# Patient Record
(demographics unavailable — no encounter records)

---

## 2024-12-10 NOTE — ASSESSMENT
[FreeTextEntry1] : 1. FA - avoid peanut - epipen - immunocap to peanuts  2. AS - albuterol prn - immunocap to environmental allergies   3. AD - moisturize, cln wash and hydrocortisone ointments. - ? trial milk avoidance

## 2024-12-10 NOTE — REVIEW OF SYSTEMS
[Urticaria] : urticaria [Atopic Dermatitis] : atopic dermatitis [Pruritus] : pruritus [Nl] : Genitourinary

## 2024-12-10 NOTE — HISTORY OF PRESENT ILLNESS
[Allergic Rhinitis] : allergic rhinitis [Venom Reactions] : venom reactions [Drug Allergies] : drug allergies [None] : The patient is currently asymptomatic [de-identified] :  WILLIAMS ALCARAZ is a 14 year yo female who in early childhood had a reaction to peanut, she was evaluated at the time and came back allergic to egg, soy and milk as well. Mom avoided it for the most part and then later down the line she ate eggs, soy and dairy with no problems but still continues to avoid the peanut. She has had small exposures requiring Benadryl but not her Epipen. She also has had eczema since infancy. Mom notes that in the last few months she has had more flareups requiring the steroid ointment given by her pediatrician. She also has a history of asthma, well managed with albuterol as needed. She is also having random bouts of hives and mom states she cannot pinpoint any exposures to anything new. Mom is here to see the status of the peanut and to see if there are any foods related to the eczema flare ups.

## 2024-12-10 NOTE — REASON FOR VISIT
[Initial Consultation] : an initial consultation for [To Food] : allergy to food [Eczema] : eczema [Hives] : hives [Mother] : mother

## 2024-12-10 NOTE — PHYSICAL EXAM
[Alert] : alert [Well Nourished] : well nourished [Healthy Appearance] : healthy appearance [No Acute Distress] : no acute distress [Well Developed] : well developed [Normal Pupil & Iris Size/Symmetry] : normal pupil and iris size and symmetry [No Discharge] : no discharge [No Photophobia] : no photophobia [Sclera Not Icteric] : sclera not icteric [Normal TMs] : both tympanic membranes were normal [Normal Nasal Mucosa] : the nasal mucosa was normal [Normal Lips/Tongue] : the lips and tongue were normal [Normal Outer Ear/Nose] : the ears and nose were normal in appearance [Normal Tonsils] : normal tonsils [No Thrush] : no thrush [Pale mucosa] : pale mucosa [Supple] : the neck was supple [Normal Rate and Effort] : normal respiratory rhythm and effort [No Crackles] : no crackles [No Retractions] : no retractions [Bilateral Audible Breath Sounds] : bilateral audible breath sounds [Normal Rate] : heart rate was normal  [Normal S1, S2] : normal S1 and S2 [No murmur] : no murmur [Regular Rhythm] : with a regular rhythm [Skin Intact] : skin intact  [No Skin Lesions] : no skin lesions [Patches] : ~M patches present [Normal Mood] : mood was normal [Normal Affect] : affect was normal [Alert, Awake, Oriented as Age-Appropriate] : alert, awake, oriented as age appropriate